# Patient Record
Sex: FEMALE | Race: WHITE
[De-identification: names, ages, dates, MRNs, and addresses within clinical notes are randomized per-mention and may not be internally consistent; named-entity substitution may affect disease eponyms.]

---
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
## 2020-04-24 ENCOUNTER — HOSPITAL ENCOUNTER (EMERGENCY)
Dept: HOSPITAL 47 - EC | Age: 20
Discharge: HOME | End: 2020-04-24
Payer: COMMERCIAL

## 2020-04-24 VITALS — RESPIRATION RATE: 18 BRPM

## 2020-04-24 VITALS — TEMPERATURE: 98.3 F | DIASTOLIC BLOOD PRESSURE: 79 MMHG | SYSTOLIC BLOOD PRESSURE: 119 MMHG | HEART RATE: 89 BPM

## 2020-04-24 DIAGNOSIS — R10.32: ICD-10-CM

## 2020-04-24 DIAGNOSIS — O99.89: Primary | ICD-10-CM

## 2020-04-24 DIAGNOSIS — Z3A.01: ICD-10-CM

## 2020-04-24 DIAGNOSIS — Z88.8: ICD-10-CM

## 2020-04-24 LAB
ALBUMIN SERPL-MCNC: 4.1 G/DL (ref 3.5–5)
ALP SERPL-CCNC: 45 U/L (ref 38–126)
ALT SERPL-CCNC: 13 U/L (ref 4–34)
ANION GAP SERPL CALC-SCNC: 9 MMOL/L
AST SERPL-CCNC: 22 U/L (ref 14–36)
BASOPHILS # BLD AUTO: 0.1 K/UL (ref 0–0.2)
BASOPHILS NFR BLD AUTO: 1 %
BUN SERPL-SCNC: 10 MG/DL (ref 7–17)
CALCIUM SPEC-MCNC: 9.3 MG/DL (ref 8.4–10.2)
CHLORIDE SERPL-SCNC: 106 MMOL/L (ref 98–107)
CO2 SERPL-SCNC: 22 MMOL/L (ref 22–30)
EOSINOPHIL # BLD AUTO: 0.1 K/UL (ref 0–0.7)
EOSINOPHIL NFR BLD AUTO: 1 %
ERYTHROCYTE [DISTWIDTH] IN BLOOD BY AUTOMATED COUNT: 4.42 M/UL (ref 3.8–5.4)
ERYTHROCYTE [DISTWIDTH] IN BLOOD: 12 % (ref 11.5–15.5)
GLUCOSE SERPL-MCNC: 76 MG/DL (ref 74–99)
HCG SERPL-MCNC: 3566.8 MIU/ML
HCT VFR BLD AUTO: 40.9 % (ref 34–46)
HGB BLD-MCNC: 13.4 GM/DL (ref 11.4–16)
LYMPHOCYTES # SPEC AUTO: 1.8 K/UL (ref 1–4.8)
LYMPHOCYTES NFR SPEC AUTO: 22 %
MCH RBC QN AUTO: 30.3 PG (ref 25–35)
MCHC RBC AUTO-ENTMCNC: 32.7 G/DL (ref 31–37)
MCV RBC AUTO: 92.6 FL (ref 80–100)
MONOCYTES # BLD AUTO: 0.3 K/UL (ref 0–1)
MONOCYTES NFR BLD AUTO: 3 %
NEUTROPHILS # BLD AUTO: 5.8 K/UL (ref 1.3–7.7)
NEUTROPHILS NFR BLD AUTO: 72 %
PH UR: 7 [PH] (ref 5–8)
PLATELET # BLD AUTO: 275 K/UL (ref 150–450)
POTASSIUM SERPL-SCNC: 3.9 MMOL/L (ref 3.5–5.1)
PROT SERPL-MCNC: 6.8 G/DL (ref 6.3–8.2)
SODIUM SERPL-SCNC: 137 MMOL/L (ref 137–145)
SP GR UR: 1.02 (ref 1–1.03)
UROBILINOGEN UR QL STRIP: <2 MG/DL (ref ?–2)
WBC # BLD AUTO: 8.1 K/UL (ref 4–11)

## 2020-04-24 PROCEDURE — 76817 TRANSVAGINAL US OBSTETRIC: CPT

## 2020-04-24 PROCEDURE — 76801 OB US < 14 WKS SINGLE FETUS: CPT

## 2020-04-24 PROCEDURE — 81003 URINALYSIS AUTO W/O SCOPE: CPT

## 2020-04-24 PROCEDURE — 80053 COMPREHEN METABOLIC PANEL: CPT

## 2020-04-24 PROCEDURE — 36415 COLL VENOUS BLD VENIPUNCTURE: CPT

## 2020-04-24 PROCEDURE — 99284 EMERGENCY DEPT VISIT MOD MDM: CPT

## 2020-04-24 PROCEDURE — 84702 CHORIONIC GONADOTROPIN TEST: CPT

## 2020-04-24 PROCEDURE — 85025 COMPLETE CBC W/AUTO DIFF WBC: CPT

## 2020-04-24 NOTE — US
EXAMINATION TYPE: Ultrasound OB <= 14 weeks transvaginal

 

DATE OF EXAM: 4/24/2020 10:18 AM

 

COMPARISON: NONE

 

CLINICAL HISTORY: 20-year-old female cramping LMP 3/26. Left pelvic pain

 

EXAM PERFORMED:  Transvaginal (TV) and Transabdominal (TA)

 

FINDINGS:

 

EXAM MEASUREMENTS:

 

GESTATIONAL AGE / DATING

 

Physician Established: Not yet established 

Dates by LMP: (5 weeks/0 days)  

** EDC: 12/25/20

Dates by First Scan:  No previous this is first scan 

Dates by Current Scan for: (5 weeks/1 days)  

** EDC: 12/24/20

 

MATERNAL ANATOMY 

 

Uterus: 8.7 x 6.1 x 7.7cm

Right Ovary: 3.8 x 2.2 x 2.6cm

Left Ovary: 2.8 x 1.4 x 1.8cm

Post CDS / Adnexa: small amount of free fluid posterior CDS

Presence of free fluid: yes

Presence of corpus luteal cyst: yes, right ovary = 2.6 x 2.1 x 2.0cm

 

 

GESTATION / FETAL SURVEY

 

MSD: 0.6cm (5 weeks/1 days)

Yolk Sac (normal less than 6mm): 0.2cm

No evidence of fetal pole at this time

 

 

Date of LMP: 03/20/20

Beta HcG (if available): [id number]

 

Sonographer notes: Gestational sac with yolk sac identified within uterus, unable to visualize fetal 
pole at this time, possible too early to detect. Probable corpus luteum right ovary. Small amount of 
free fluid posterior cul-de-sac 

 

 

 

IMPRESSION:

 

1. Small intrauterine gestational sac with yolk sac visualized. MSD places the gestation at 5 weeks 1
 day, concordant with gestational age by LMP (5 weeks 0 days). Follow-up ultrasound recommended to en
sure the appearance of a fetal pole with cardiac activity to ensure a viable intrauterine pregnancy.

2. Small amount of cul-de-sac free fluid, likely physiologic. A 2.6 cm corpus luteum on the right.

## 2020-04-24 NOTE — ED
General Adult HPI





- General


Chief complaint: Abdominal Pain


Stated complaint: abd pain/early pregnancy


Time Seen by Provider: 04/24/20 08:52


Source: patient, RN notes reviewed, old records reviewed


Mode of arrival: ambulatory


Limitations: no limitations





- History of Present Illness


Initial comments: 


20-year-old female patient who believes that she is essentially approximately 6 

weeks pregnant presents to ED for chief complaint of vaginal cramping.  Patient 

for that her last menstrual period was around 3/20.  She states that she has 

taken 2 positive pregnancy tests since then.  Reports that last Tuesday she is 

having some cramping in her lower quadrant mostly on her left side.  Has been 

taking prenatal vitamins.  Is not yet established with OB/GYN.  Denies any 

dysuria, vaginal bleeding.





Systemic: Pt denies fatigue, fever/chills, rash. Pt denies weakness, night 

sweats, weight loss. 


Neuro: Pt denies headache, visual disturbances, syncope or pre-syncope.


HEENT: Pt denies ocular discharge or irritation, otalgia, rhinorrhea, 

pharyngitis or notable lymphadenopathy. 


Cardiopulmonary: Pt denies chest pain, SOB, heart palpitations, dyspnea on exert

ion.  


Abdominal/GI: Pt denies n/v/d. 


: Pt denies dysuria, burning w/ urination, frequency/urgency. Denies new onset

urinary or bowel incontinence.  


MSK: Pt denies myalgia, loss of strength or function in extremities. 


Neuro: Pt denies new onset weakness, paresthesias. 








- Related Data


                                Home Medications











 Medication  Instructions  Recorded  Confirmed


 


No Known Home Medications  03/06/16 03/06/16











                                    Allergies











Allergy/AdvReac Type Severity Reaction Status Date / Time


 


omeprazole Allergy  Rash/Hives Verified 04/24/20 08:48














Review of Systems


ROS Statement: 


Those systems with pertinent positive or pertinent negative responses have been 

documented in the HPI.





ROS Other: All systems not noted in ROS Statement are negative.





Past Medical History


Past Medical History: No Reported History


History of Any Multi-Drug Resistant Organisms: None Reported


Past Surgical History: No Surgical Hx Reported


Past Psychological History: ADD/ADHD


Smoking Status: Never smoker


Past Alcohol Use History: None Reported


Past Drug Use History: None Reported





General Exam





- General Exam Comments


Initial Comments: 








Constitutional: NAD, AOX3, Pt has pleasant affect. 


HEENT: NC/AT, trachea midline, neck supple, no lymphadenopathy. Posterior 

pharynx non erythematous, without exudates. External ears appear normal, without

discharge. Mucous membranes moist. Eyes PERRLA, EOM intact. There is no scleral 

icterus. No pallor noted. 


Cardiopulmonary: RRR, no murmurs, rubs or gallops, no JVD noted. Lungs CTAB in 

anterior and posterior fields. No peripheral edema. 


Abdominal exam: Abdomen soft and non-distended. Abdomen mildly tender to 

palpation left lower quadrant region.. Bowel sounds active in LLQ. No he

patosplenomegaly. No ecchymosis


Neuro: CN II-XII grossly intact. No nuchal rigidity. No raccon eyes, no marte 

sign, no hemotympanum. No cervical spinal tenderness. 


MSK: No posterior calf tenderness bilaterally, homans sign negative bilaterally.

Posterior tibialis and radial pulse +2 bilaterally. Sensation intact in upper 

and lower extremities. Full active ROM in upper and lower extremities, 5/5 

stregnth. 














Limitations: no limitations





Course


                                   Vital Signs











  04/24/20





  08:48


 


Temperature 98.9 F


 


Pulse Rate 85


 


Respiratory 18





Rate 


 


Blood Pressure 112/70


 


O2 Sat by Pulse 99





Oximetry 














Medical Decision Making





- Medical Decision Making


20-year-old female patient who believes that she is essentially approximately 6 

weeks pregnant presents to ED for chief complaint of vaginal cramping.  Patient 

for that her last menstrual period was around 3/20.  She states that she has 

taken 2 positive pregnancy tests since then.  Reports that last Tuesday she is 

having some cramping in her lower quadrant mostly on her left side.  Has been 

taking prenatal vitamins.  Is not yet established with OB/GYN.  Denies any 

dysuria, vaginal bleeding.  Patient fell signs stable, afebrile.  Physical exam 

displayed very mild left lower quadrant tenderness.  No guarding no rigidity.  

Laboratory investigations revealed hCG Quant of 3566.  UA is negative.  

Transvaginal ultrasound display a small intrauterine gestational yolk sac, 

gestation estimated 5 weeks and 1 day.  Fetal pole is not yet visualized.  2.6 

cm likely corpus luteum cyst noted on the right.  Patient will be discharged 

with outpatient OB follow-up and will return to ER if condition worsens.  Case 

discussed with Dr. Guillen. 








- Lab Data


Result diagrams: 


                                 04/24/20 09:10





                                 04/24/20 09:10


                                   Lab Results











  04/24/20 04/24/20 04/24/20 Range/Units





  09:10 09:10 09:10 


 


WBC  8.1    (4.0-11.0)  k/uL


 


RBC  4.42    (3.80-5.40)  m/uL


 


Hgb  13.4    (11.4-16.0)  gm/dL


 


Hct  40.9    (34.0-46.0)  %


 


MCV  92.6    (80.0-100.0)  fL


 


MCH  30.3    (25.0-35.0)  pg


 


MCHC  32.7    (31.0-37.0)  g/dL


 


RDW  12.0    (11.5-15.5)  %


 


Plt Count  275    (150-450)  k/uL


 


Neutrophils %  72    %


 


Lymphocytes %  22    %


 


Monocytes %  3    %


 


Eosinophils %  1    %


 


Basophils %  1    %


 


Neutrophils #  5.8    (1.3-7.7)  k/uL


 


Lymphocytes #  1.8    (1.0-4.8)  k/uL


 


Monocytes #  0.3    (0-1.0)  k/uL


 


Eosinophils #  0.1    (0-0.7)  k/uL


 


Basophils #  0.1    (0-0.2)  k/uL


 


Sodium    137  (137-145)  mmol/L


 


Potassium    3.9  (3.5-5.1)  mmol/L


 


Chloride    106  ()  mmol/L


 


Carbon Dioxide    22  (22-30)  mmol/L


 


Anion Gap    9  mmol/L


 


BUN    10  (7-17)  mg/dL


 


Creatinine    0.54  (0.52-1.04)  mg/dL


 


Est GFR (CKD-EPI)AfAm    >90  (>60 ml/min/1.73 sqM)  


 


Est GFR (CKD-EPI)NonAf    >90  (>60 ml/min/1.73 sqM)  


 


Glucose    76  (74-99)  mg/dL


 


Calcium    9.3  (8.4-10.2)  mg/dL


 


Total Bilirubin    0.9  (0.2-1.3)  mg/dL


 


AST    22  (14-36)  U/L


 


ALT    13  (4-34)  U/L


 


Alkaline Phosphatase    45  ()  U/L


 


Total Protein    6.8  (6.3-8.2)  g/dL


 


Albumin    4.1  (3.5-5.0)  g/dL


 


HCG, Quant    3566.8  mIU/mL


 


Urine Color   Yellow   


 


Urine Appearance   Clear   (Clear)  


 


Urine pH   7.0   (5.0-8.0)  


 


Ur Specific Gravity   1.017   (1.001-1.035)  


 


Urine Protein   Negative   (Negative)  


 


Urine Glucose (UA)   Negative   (Negative)  


 


Urine Ketones   Negative   (Negative)  


 


Urine Blood   Negative   (Negative)  


 


Urine Nitrite   Negative   (Negative)  


 


Urine Bilirubin   Negative   (Negative)  


 


Urine Urobilinogen   <2.0   (<2.0)  mg/dL


 


Ur Leukocyte Esterase   Negative   (Negative)  














Disposition


Clinical Impression: 


 Pregnancy, Abdominal cramping





Disposition: HOME SELF-CARE


Condition: Stable


Instructions (If sedation given, give patient instructions):  Pregnancy (ED)


Additional Instructions: 


Follow-up with primary care provider and OB/GYN.  Call today after discharge.  

Return to ER if condition worsens.  Continue taking prenatal vitamins as 

directed. 


Is patient prescribed a controlled substance at d/c from ED?: No


Referrals: 


Rachell Jones MD [Primary Care Provider] - 1-2 days


Kareem Chen MD [STAFF PHYSICIAN] - 1-2 days

## 2020-04-26 ENCOUNTER — HOSPITAL ENCOUNTER (EMERGENCY)
Dept: HOSPITAL 47 - EC | Age: 20
Discharge: HOME | End: 2020-04-26
Payer: COMMERCIAL

## 2020-04-26 VITALS — TEMPERATURE: 98.1 F

## 2020-04-26 VITALS — HEART RATE: 71 BPM | SYSTOLIC BLOOD PRESSURE: 117 MMHG | DIASTOLIC BLOOD PRESSURE: 61 MMHG

## 2020-04-26 VITALS — RESPIRATION RATE: 20 BRPM

## 2020-04-26 DIAGNOSIS — R82.71: ICD-10-CM

## 2020-04-26 DIAGNOSIS — Z88.8: ICD-10-CM

## 2020-04-26 DIAGNOSIS — O20.9: Primary | ICD-10-CM

## 2020-04-26 DIAGNOSIS — Z3A.01: ICD-10-CM

## 2020-04-26 DIAGNOSIS — O99.89: ICD-10-CM

## 2020-04-26 LAB
ALBUMIN SERPL-MCNC: 4.3 G/DL (ref 3.5–5)
ALP SERPL-CCNC: 42 U/L (ref 38–126)
ALT SERPL-CCNC: 13 U/L (ref 4–34)
ANION GAP SERPL CALC-SCNC: 7 MMOL/L
AST SERPL-CCNC: 29 U/L (ref 14–36)
BASOPHILS # BLD AUTO: 0.1 K/UL (ref 0–0.2)
BASOPHILS NFR BLD AUTO: 1 %
BUN SERPL-SCNC: 9 MG/DL (ref 7–17)
CALCIUM SPEC-MCNC: 9.5 MG/DL (ref 8.4–10.2)
CHLORIDE SERPL-SCNC: 106 MMOL/L (ref 98–107)
CO2 SERPL-SCNC: 23 MMOL/L (ref 22–30)
EOSINOPHIL # BLD AUTO: 0.1 K/UL (ref 0–0.7)
EOSINOPHIL NFR BLD AUTO: 1 %
ERYTHROCYTE [DISTWIDTH] IN BLOOD BY AUTOMATED COUNT: 4.55 M/UL (ref 3.8–5.4)
ERYTHROCYTE [DISTWIDTH] IN BLOOD: 12.1 % (ref 11.5–15.5)
GLUCOSE SERPL-MCNC: 79 MG/DL (ref 74–99)
HCG SERPL-MCNC: 8883.5 MIU/ML
HCT VFR BLD AUTO: 41.8 % (ref 34–46)
HGB BLD-MCNC: 13.9 GM/DL (ref 11.4–16)
LYMPHOCYTES # SPEC AUTO: 1.9 K/UL (ref 1–4.8)
LYMPHOCYTES NFR SPEC AUTO: 21 %
MCH RBC QN AUTO: 30.5 PG (ref 25–35)
MCHC RBC AUTO-ENTMCNC: 33.2 G/DL (ref 31–37)
MCV RBC AUTO: 91.9 FL (ref 80–100)
MONOCYTES # BLD AUTO: 0.4 K/UL (ref 0–1)
MONOCYTES NFR BLD AUTO: 4 %
NEUTROPHILS # BLD AUTO: 6.5 K/UL (ref 1.3–7.7)
NEUTROPHILS NFR BLD AUTO: 71 %
PH UR: 6 [PH] (ref 5–8)
PLATELET # BLD AUTO: 292 K/UL (ref 150–450)
POTASSIUM SERPL-SCNC: 4.3 MMOL/L (ref 3.5–5.1)
PROT SERPL-MCNC: 7 G/DL (ref 6.3–8.2)
RBC UR QL: 7 /HPF (ref 0–5)
SODIUM SERPL-SCNC: 136 MMOL/L (ref 137–145)
SP GR UR: 1.03 (ref 1–1.03)
SQUAMOUS UR QL AUTO: 23 /HPF (ref 0–4)
UROBILINOGEN UR QL STRIP: <2 MG/DL (ref ?–2)
WBC # BLD AUTO: 9.1 K/UL (ref 4–11)
WBC #/AREA URNS HPF: 20 /HPF (ref 0–5)

## 2020-04-26 PROCEDURE — 36415 COLL VENOUS BLD VENIPUNCTURE: CPT

## 2020-04-26 PROCEDURE — 84702 CHORIONIC GONADOTROPIN TEST: CPT

## 2020-04-26 PROCEDURE — 87808 TRICHOMONAS ASSAY W/OPTIC: CPT

## 2020-04-26 PROCEDURE — 86900 BLOOD TYPING SEROLOGIC ABO: CPT

## 2020-04-26 PROCEDURE — 87591 N.GONORRHOEAE DNA AMP PROB: CPT

## 2020-04-26 PROCEDURE — 81025 URINE PREGNANCY TEST: CPT

## 2020-04-26 PROCEDURE — 85025 COMPLETE CBC W/AUTO DIFF WBC: CPT

## 2020-04-26 PROCEDURE — 87491 CHLMYD TRACH DNA AMP PROBE: CPT

## 2020-04-26 PROCEDURE — 86901 BLOOD TYPING SEROLOGIC RH(D): CPT

## 2020-04-26 PROCEDURE — 80053 COMPREHEN METABOLIC PANEL: CPT

## 2020-04-26 PROCEDURE — 99284 EMERGENCY DEPT VISIT MOD MDM: CPT

## 2020-04-26 PROCEDURE — 86850 RBC ANTIBODY SCREEN: CPT

## 2020-04-26 PROCEDURE — 87070 CULTURE OTHR SPECIMN AEROBIC: CPT

## 2020-04-26 PROCEDURE — 96372 THER/PROPH/DIAG INJ SC/IM: CPT

## 2020-04-26 PROCEDURE — 81001 URINALYSIS AUTO W/SCOPE: CPT

## 2020-04-26 NOTE — ED
General Adult HPI





- General


Chief complaint: Vaginal Bleeding


Stated complaint: vaginal bleeding pregnant


Time Seen by Provider: 20 10:46


Source: patient, RN notes reviewed, old records reviewed


Mode of arrival: ambulatory


Limitations: no limitations





- History of Present Illness


Initial comments: 


20-year-old female patient proximally 5 weeks gestation  presents to ED for

evaluation of vaginal bleeding.  Patient was initially seen for cramping in the 

in the emergency department on .  Patient reports that she is still having 

some mild suprapubic cramping however yesterday and today she began having some 

very mild bleeding.  She denies any dysuria or any concern for STI.  Denies any 

other complaints.





Systemic: Pt denies fatigue, fever/chills, rash. Pt denies weakness, night 

sweats, weight loss. 


Neuro: Pt denies headache, visual disturbances, syncope or pre-syncope.


HEENT: Pt denies ocular discharge or irritation, otalgia, rhinorrhea, 

pharyngitis or notable lymphadenopathy. 


Cardiopulmonary: Pt denies chest pain, SOB, heart palpitations, dyspnea on 

exertion.  


Abdominal/GI: Pt denies abdominal pain, n/v/d. 


: Pt denies dysuria, burning w/ urination, frequency/urgency. Denies new onset

urinary or bowel incontinence.  


MSK: Pt denies myalgia, loss of strength or function in extremities. 


Neuro: Pt denies new onset weakness, paresthesias. 











- Related Data


                                  Previous Rx's











 Medication  Instructions  Recorded


 


Cephalexin [Keflex] 500 mg PO Q12HR 5 Days #10 cap 20











                                    Allergies











Allergy/AdvReac Type Severity Reaction Status Date / Time


 


omeprazole Allergy  Rash/Hives Verified 20 10:42














Review of Systems


ROS Statement: 


Those systems with pertinent positive or pertinent negative responses have been 

documented in the HPI.





ROS Other: All systems not noted in ROS Statement are negative.





Past Medical History


Past Medical History: No Reported History


History of Any Multi-Drug Resistant Organisms: None Reported


Past Surgical History: No Surgical Hx Reported


Past Psychological History: ADD/ADHD


Smoking Status: Never smoker


Past Alcohol Use History: None Reported


Past Drug Use History: None Reported





General Exam





- General Exam Comments


Initial Comments: 








Constitutional: NAD, AOX3, Pt has pleasant affect. 


HEENT: NC/AT, trachea midline, neck supple, no lymphadenopathy. Posterior 

pharynx non erythematous, without exudates. External ears appear normal, without

 discharge. Mucous membranes moist. Eyes PERRLA, EOM intact. There is no scleral

 icterus. No pallor noted. 


Cardiopulmonary: RRR, no murmurs, rubs or gallops, no JVD noted. Lungs CTAB in 

anterior and posterior fields. No peripheral edema. 


Abdominal exam: Abdomen soft and non-distended. Abdomen non-tender to palpation 

in all 4 quadrants.  Bowel sounds active in LLQ. No hepatosplenomegaly. No 

ecchymosis


Neuro: CN II-XII grossly intact. No nuchal rigidity. No raccon eyes, no marte 

sign, no hemotympanum. No cervical spinal tenderness. 


MSK: No posterior calf tenderness bilaterally, homans sign negative bilaterally.

 Posterior tibialis and radial pulse +2 bilaterally. Sensation intact in upper 

and lower extremities. Full active ROM in upper and lower extremities, 5/5 

stregnth. 


Pelvic: Pelvic exam performed.  Cervical os is closed.  No lesions, mucova pink.

 No purulent drainage noted. Chaperogned by ALEX Spaulding 











Limitations: no limitations





Course





                                   Vital Signs











  20





  10:42 10:45 11:45


 


Temperature 98.1 F  


 


Pulse Rate 102 H  


 


Respiratory 18 20 20





Rate   


 


Blood Pressure 98/57  


 


O2 Sat by Pulse 99  





Oximetry   














  20





  12:45


 


Temperature 


 


Pulse Rate 78


 


Respiratory 20





Rate 


 


Blood Pressure 110/59


 


O2 Sat by Pulse 99





Oximetry 














Medical Decision Making





- Medical Decision Making








20-year-old female patient proximally 5 weeks gestation  presents to ED for

 evaluation of vaginal bleeding.  Patient was initially seen for cramping in the

 in the emergency department on .  Patient reports that she is still having 

some mild suprapubic cramping however yesterday and today she began having some 

very mild bleeding.  She denies any dysuria or any concern for STI.  Denies any 

other complaints.  Patient vital signs are stable, afebrile.  Physical exam 

displayed: Abdomen soft and non-distended. Abdomen non-tender to palpation in 

all 4 quadrants.  Previous ultrasound displayed small effusion on gestation with

 yolk sac estimated 5 weeks 1 day.  HCG Quant kim from [phone].  UA was 

contaminated with squamous epithelial cells are did display 20 white cells and 

bacteria.  Patient was offered and did wish to have a pelvic exam performed, 

chaperogned by Man.  This displayed closed cervix no blood was noted.  

Patient discharged in stable condition.  Patient is in no pain at time of disc

harge.  He'll follow up with primary care provider as well as OB/GYN.  Patient 

will have repeat beta hCG performed in 48 hours to ensure appropriate increase. 

Keflex for asyptomatic bacturia. Will return to ED if condition worsens. Case 

discussed with Dr. Perkins. 














- Lab Data


Result diagrams: 


                                 20 11:50





                                 20 11:50





                                   Lab Results











  20 Range/Units





  11:30 11:50 11:50 


 


WBC   9.1   (4.0-11.0)  k/uL


 


RBC   4.55   (3.80-5.40)  m/uL


 


Hgb   13.9   (11.4-16.0)  gm/dL


 


Hct   41.8   (34.0-46.0)  %


 


MCV   91.9   (80.0-100.0)  fL


 


MCH   30.5   (25.0-35.0)  pg


 


MCHC   33.2   (31.0-37.0)  g/dL


 


RDW   12.1   (11.5-15.5)  %


 


Plt Count   292   (150-450)  k/uL


 


Neutrophils %   71   %


 


Lymphocytes %   21   %


 


Monocytes %   4   %


 


Eosinophils %   1   %


 


Basophils %   1   %


 


Neutrophils #   6.5   (1.3-7.7)  k/uL


 


Lymphocytes #   1.9   (1.0-4.8)  k/uL


 


Monocytes #   0.4   (0-1.0)  k/uL


 


Eosinophils #   0.1   (0-0.7)  k/uL


 


Basophils #   0.1   (0-0.2)  k/uL


 


Sodium     (137-145)  mmol/L


 


Potassium     (3.5-5.1)  mmol/L


 


Chloride     ()  mmol/L


 


Carbon Dioxide     (22-30)  mmol/L


 


Anion Gap     mmol/L


 


BUN     (7-17)  mg/dL


 


Creatinine     (0.52-1.04)  mg/dL


 


Est GFR (CKD-EPI)AfAm     (>60 ml/min/1.73 sqM)  


 


Est GFR (CKD-EPI)NonAf     (>60 ml/min/1.73 sqM)  


 


Glucose     (74-99)  mg/dL


 


Calcium     (8.4-10.2)  mg/dL


 


Total Bilirubin     (0.2-1.3)  mg/dL


 


AST     (14-36)  U/L


 


ALT     (4-34)  U/L


 


Alkaline Phosphatase     ()  U/L


 


Total Protein     (6.3-8.2)  g/dL


 


Albumin     (3.5-5.0)  g/dL


 


HCG, Quant     mIU/mL


 


Urine Color     


 


Urine Appearance     (Clear)  


 


Urine pH     (5.0-8.0)  


 


Ur Specific Gravity     (1.001-1.035)  


 


Urine Protein     (Negative)  


 


Urine Glucose (UA)     (Negative)  


 


Urine Ketones     (Negative)  


 


Urine Blood     (Negative)  


 


Urine Nitrite     (Negative)  


 


Urine Bilirubin     (Negative)  


 


Urine Urobilinogen     (<2.0)  mg/dL


 


Ur Leukocyte Esterase     (Negative)  


 


Urine RBC     (0-5)  /hpf


 


Urine WBC     (0-5)  /hpf


 


Ur Squamous Epith Cells     (0-4)  /hpf


 


Urine Bacteria     (None)  /hpf


 


Urine Mucus     (None)  /hpf


 


Urine HCG, Qual    Detected  (Not Detectd)  


 


Blood Type  O Negative    


 


Blood Type Recheck  No Previous Record    


 


Bld Type Recheck Status  Prenatal    


 


Antibody Screen  NEGATIVE    














  20 Range/Units





  11:50 11:50 


 


WBC    (4.0-11.0)  k/uL


 


RBC    (3.80-5.40)  m/uL


 


Hgb    (11.4-16.0)  gm/dL


 


Hct    (34.0-46.0)  %


 


MCV    (80.0-100.0)  fL


 


MCH    (25.0-35.0)  pg


 


MCHC    (31.0-37.0)  g/dL


 


RDW    (11.5-15.5)  %


 


Plt Count    (150-450)  k/uL


 


Neutrophils %    %


 


Lymphocytes %    %


 


Monocytes %    %


 


Eosinophils %    %


 


Basophils %    %


 


Neutrophils #    (1.3-7.7)  k/uL


 


Lymphocytes #    (1.0-4.8)  k/uL


 


Monocytes #    (0-1.0)  k/uL


 


Eosinophils #    (0-0.7)  k/uL


 


Basophils #    (0-0.2)  k/uL


 


Sodium  136 L   (137-145)  mmol/L


 


Potassium  4.3   (3.5-5.1)  mmol/L


 


Chloride  106   ()  mmol/L


 


Carbon Dioxide  23   (22-30)  mmol/L


 


Anion Gap  7   mmol/L


 


BUN  9   (7-17)  mg/dL


 


Creatinine  0.53   (0.52-1.04)  mg/dL


 


Est GFR (CKD-EPI)AfAm  >90   (>60 ml/min/1.73 sqM)  


 


Est GFR (CKD-EPI)NonAf  >90   (>60 ml/min/1.73 sqM)  


 


Glucose  79   (74-99)  mg/dL


 


Calcium  9.5   (8.4-10.2)  mg/dL


 


Total Bilirubin  1.1   (0.2-1.3)  mg/dL


 


AST  29   (14-36)  U/L


 


ALT  13   (4-34)  U/L


 


Alkaline Phosphatase  42   ()  U/L


 


Total Protein  7.0   (6.3-8.2)  g/dL


 


Albumin  4.3   (3.5-5.0)  g/dL


 


HCG, Quant  8883.5   mIU/mL


 


Urine Color   Yellow  


 


Urine Appearance   Cloudy H  (Clear)  


 


Urine pH   6.0  (5.0-8.0)  


 


Ur Specific Gravity   1.029  (1.001-1.035)  


 


Urine Protein   Trace H  (Negative)  


 


Urine Glucose (UA)   Negative  (Negative)  


 


Urine Ketones   Negative  (Negative)  


 


Urine Blood   Moderate H  (Negative)  


 


Urine Nitrite   Negative  (Negative)  


 


Urine Bilirubin   Negative  (Negative)  


 


Urine Urobilinogen   <2.0  (<2.0)  mg/dL


 


Ur Leukocyte Esterase   Large H  (Negative)  


 


Urine RBC   7 H  (0-5)  /hpf


 


Urine WBC   20 H  (0-5)  /hpf


 


Ur Squamous Epith Cells   23 H  (0-4)  /hpf


 


Urine Bacteria   Rare H  (None)  /hpf


 


Urine Mucus   Many H  (None)  /hpf


 


Urine HCG, Qual    (Not Detectd)  


 


Blood Type    


 


Blood Type Recheck    


 


Bld Type Recheck Status    


 


Antibody Screen    














Disposition


Clinical Impression: 


 Asymptomatic bacteriuria, Vaginal bleeding affecting early pregnancy





Disposition: HOME SELF-CARE


Condition: Stable


Instructions (If sedation given, give patient instructions):  Threatened Mis

carriage (ED)


Additional Instructions: 


Call OB/GYN after discharge for close outpatient follow-up.  Follow-up with 

primary care provider tomorrow.  Take Keflex as directed.  Have repeat blood 

draw in 48 hours to ensure appropriate rise in hCG.  Return to ER if condition 

worsens.


Is patient prescribed a controlled substance at d/c from ED?: No


Referrals: 


Rachell Jones MD [Primary Care Provider] - 1-2 days


Valentina Barroso MD [STAFF PHYSICIAN] - 1-2 days

## 2021-07-26 ENCOUNTER — HOSPITAL ENCOUNTER (EMERGENCY)
Dept: HOSPITAL 47 - EC | Age: 21
Discharge: HOME | End: 2021-07-26
Payer: COMMERCIAL

## 2021-07-26 VITALS
RESPIRATION RATE: 18 BRPM | DIASTOLIC BLOOD PRESSURE: 81 MMHG | TEMPERATURE: 97.6 F | SYSTOLIC BLOOD PRESSURE: 119 MMHG | HEART RATE: 72 BPM

## 2021-07-26 DIAGNOSIS — Z88.8: ICD-10-CM
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
## 2021-07-26 DIAGNOSIS — S13.9XXA: Primary | ICD-10-CM
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
## 2021-07-26 PROCEDURE — 71045 X-RAY EXAM CHEST 1 VIEW: CPT

## 2021-07-26 PROCEDURE — 72170 X-RAY EXAM OF PELVIS: CPT

## 2021-07-26 PROCEDURE — 99284 EMERGENCY DEPT VISIT MOD MDM: CPT

## 2021-07-26 PROCEDURE — 72125 CT NECK SPINE W/O DYE: CPT

## 2021-07-26 PROCEDURE — 70450 CT HEAD/BRAIN W/O DYE: CPT
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
## 2021-07-26 NOTE — XR
EXAMINATION TYPE: XR pelvis AP view

 

DATE OF EXAM: 7/26/2021

 

COMPARISON: NONE

 

HISTORY: Pain

 

TECHNIQUE: Single view

 

FINDINGS: Pelvic ring appears intact. Proximal femurs and hip joints appear intact. Sacroiliac joints
 appear normal.

 

IMPRESSION: Normal pelvis
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
## 2021-08-20 ENCOUNTER — HOSPITAL ENCOUNTER (EMERGENCY)
Dept: HOSPITAL 47 - EC | Age: 21
Discharge: HOME | End: 2021-08-20
Payer: COMMERCIAL

## 2021-08-20 VITALS — RESPIRATION RATE: 18 BRPM | DIASTOLIC BLOOD PRESSURE: 66 MMHG | HEART RATE: 78 BPM | SYSTOLIC BLOOD PRESSURE: 124 MMHG

## 2021-08-20 VITALS — TEMPERATURE: 99.1 F

## 2021-08-20 DIAGNOSIS — S63.501A: ICD-10-CM
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
## 2021-08-20 DIAGNOSIS — Z87.891: ICD-10-CM

## 2021-08-20 DIAGNOSIS — S43.51XA: Primary | ICD-10-CM
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
## 2021-08-20 PROCEDURE — 99284 EMERGENCY DEPT VISIT MOD MDM: CPT

## 2021-08-20 NOTE — XR
EXAMINATION TYPE: XR wrist complete RT

 

DATE OF EXAM: 8/20/2021

 

COMPARISON: NONE

 

HISTORY: Pain

 

TECHNIQUE: Four views submitted.

 

FINDINGS:

The osseous structures are intact.  The joint spaces are preserved and there is no acute fracture or 
dislocation.  

 

IMPRESSION:

1.  No definite acute fracture or dislocation if symptoms persist, follow-up study in 7 to 10 days wo
uld be suggested

## 2021-08-20 NOTE — XR
EXAMINATION TYPE: XR shoulder complete RT

 

DATE OF EXAM: 8/20/2021

 

COMPARISON: NONE

 

HISTORY: Pain

 

TECHNIQUE: Three views are submitted.

 

FINDINGS:

The osseous structures are intact.  There is no acute fracture or dislocation.  The AC joint is maint
ained. Slight elevation of the clavicle. 

 

IMPRESSION:

1. No acute fracture.  AC joint distance is maintained. There is slight elevation of clavicle which c
ould be congenital. If concern for AC joint separation correlate with MRI.
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
## 2021-08-20 NOTE — XR
EXAMINATION TYPE: XR elbow complete LT

 

DATE OF EXAM: 8/20/2021

 

COMPARISON: NONE

 

HISTORY: Pain

 

FINDINGS: 

Three views of the elbow demonstrate no pathologic joint effusion.  The osseous structures are intact
.  There is no acute fracture or dislocation.  

 

 

IMPRESSION:

1. No acute fracture or dislocation.  If symptoms persist follow-up study in 7 to 10 days could be ob
tained.

## 2021-08-20 NOTE — XR
EXAMINATION TYPE: XR hand complete RT

 

DATE OF EXAM: 8/20/2021

 

COMPARISON: NONE

 

HISTORY: Pain

 

TECHNIQUE: Three views are submitted.

 

FINDINGS:

The osseous structures are intact.  The joint spaces are preserved and there is no acute fracture or 
dislocation.  

 

IMPRESSION:

1.  No definite acute fracture or dislocation if symptoms persist, follow-up study in 7 to 10 days wo
uld be suggested

## 2021-08-22 ENCOUNTER — HOSPITAL ENCOUNTER (EMERGENCY)
Dept: HOSPITAL 47 - EC | Age: 21
Discharge: HOME | End: 2021-08-22
Payer: COMMERCIAL

## 2021-08-22 VITALS — TEMPERATURE: 98 F | SYSTOLIC BLOOD PRESSURE: 128 MMHG | DIASTOLIC BLOOD PRESSURE: 70 MMHG | HEART RATE: 78 BPM

## 2021-08-22 VITALS — RESPIRATION RATE: 16 BRPM

## 2021-08-22 DIAGNOSIS — Z87.891: ICD-10-CM

## 2021-08-22 DIAGNOSIS — N39.0: Primary | ICD-10-CM

## 2021-08-22 DIAGNOSIS — Z88.8: ICD-10-CM

## 2021-08-22 LAB
ALBUMIN SERPL-MCNC: 3.8 G/DL (ref 3.5–5)
ALP SERPL-CCNC: 48 U/L (ref 38–126)
ALT SERPL-CCNC: 9 U/L (ref 4–34)
AMYLASE SERPL-CCNC: 62 U/L (ref 30–110)
ANION GAP SERPL CALC-SCNC: 8 MMOL/L
AST SERPL-CCNC: 20 U/L (ref 14–36)
BASOPHILS # BLD AUTO: 0.1 K/UL (ref 0–0.2)
BASOPHILS NFR BLD AUTO: 1 %
BUN SERPL-SCNC: 9 MG/DL (ref 7–17)
CALCIUM SPEC-MCNC: 9.5 MG/DL (ref 8.4–10.2)
CHLORIDE SERPL-SCNC: 106 MMOL/L (ref 98–107)
CO2 SERPL-SCNC: 23 MMOL/L (ref 22–30)
EOSINOPHIL # BLD AUTO: 0.3 K/UL (ref 0–0.7)
EOSINOPHIL NFR BLD AUTO: 3 %
ERYTHROCYTE [DISTWIDTH] IN BLOOD BY AUTOMATED COUNT: 4.06 M/UL (ref 3.8–5.4)
ERYTHROCYTE [DISTWIDTH] IN BLOOD: 12.9 % (ref 11.5–15.5)
GLUCOSE SERPL-MCNC: 86 MG/DL (ref 74–99)
HCT VFR BLD AUTO: 37.9 % (ref 34–46)
HGB BLD-MCNC: 13 GM/DL (ref 11.4–16)
LIPASE SERPL-CCNC: 66 U/L (ref 23–300)
LYMPHOCYTES # SPEC AUTO: 1.8 K/UL (ref 1–4.8)
LYMPHOCYTES NFR SPEC AUTO: 15 %
MCH RBC QN AUTO: 32.1 PG (ref 25–35)
MCHC RBC AUTO-ENTMCNC: 34.5 G/DL (ref 31–37)
MCV RBC AUTO: 93.3 FL (ref 80–100)
MONOCYTES # BLD AUTO: 0.5 K/UL (ref 0–1)
MONOCYTES NFR BLD AUTO: 4 %
NEUTROPHILS # BLD AUTO: 9 K/UL (ref 1.3–7.7)
NEUTROPHILS NFR BLD AUTO: 76 %
PLATELET # BLD AUTO: 264 K/UL (ref 150–450)
POTASSIUM SERPL-SCNC: 3.8 MMOL/L (ref 3.5–5.1)
PROT SERPL-MCNC: 6.2 G/DL (ref 6.3–8.2)
RBC UR QL: >182 /HPF (ref 0–5)
SODIUM SERPL-SCNC: 137 MMOL/L (ref 137–145)
WBC # BLD AUTO: 11.8 K/UL (ref 3.8–10.6)
WBC # UR AUTO: >182 /HPF (ref 0–5)

## 2021-08-22 PROCEDURE — 36415 COLL VENOUS BLD VENIPUNCTURE: CPT

## 2021-08-22 PROCEDURE — 81001 URINALYSIS AUTO W/SCOPE: CPT

## 2021-08-22 PROCEDURE — 83690 ASSAY OF LIPASE: CPT

## 2021-08-22 PROCEDURE — 85025 COMPLETE CBC W/AUTO DIFF WBC: CPT

## 2021-08-22 PROCEDURE — 80053 COMPREHEN METABOLIC PANEL: CPT

## 2021-08-22 PROCEDURE — 87086 URINE CULTURE/COLONY COUNT: CPT

## 2021-08-22 PROCEDURE — 99284 EMERGENCY DEPT VISIT MOD MDM: CPT

## 2021-08-22 PROCEDURE — 74177 CT ABD & PELVIS W/CONTRAST: CPT

## 2021-08-22 PROCEDURE — 96361 HYDRATE IV INFUSION ADD-ON: CPT

## 2021-08-22 PROCEDURE — 87591 N.GONORRHOEAE DNA AMP PROB: CPT

## 2021-08-22 PROCEDURE — 87491 CHLMYD TRACH DNA AMP PROBE: CPT

## 2021-08-22 PROCEDURE — 96374 THER/PROPH/DIAG INJ IV PUSH: CPT

## 2021-08-22 PROCEDURE — 81025 URINE PREGNANCY TEST: CPT

## 2021-08-22 PROCEDURE — 82150 ASSAY OF AMYLASE: CPT

## 2021-08-22 NOTE — CT
EXAMINATION TYPE: CT abdomen pelvis w con

 

DATE OF EXAM: 8/22/2021

 

COMPARISON: None

 

HISTORY: Abdominal pain

 

CT DLP: 769.7 mGycm

Automated exposure control for dose reduction was used.

 

TECHNIQUE:  Helical acquisition of images was performed from the lung bases through the pelvis.

 

CONTRAST: 

Performed without Oral Contrast and with IV Contrast, patient injected with 100 ml mL of Isovue 300.

 

FINDINGS: 

 

The lung bases are clear.

 

The gallbladder is normal and there is no pericholecystic fluid, gallbladder distention, gallbladder 
wall thickening or gallstones. There is no biliary ductal dilatation.

 

There is no focal mass or organomegaly involving the liver pancreas, spleen or adrenal glands.

 

The kidneys excrete contrast probably symmetrically and there is no solid renal mass or hydronephrosi
s. There is no retroperitoneal adenopathy or hemorrhage in the caliber of the abdominal aorta is norm
al.

 

The bowel loops are normal in caliber. There is no bowel wall thickening. There are no inflammatory c
hanges in the mesentery. There is no free intraperitoneal air or fluid.

 

There is a 7 cm fluid density mass in the midline pelvis extending to the left of midline most likely
 representing a large ovarian cyst.

 

There is no pelvic adenopathy or free fluid.

 

The visualized osseous structures are intact.

 

IMPRESSION:

 

7 cm cyst within the pelvis as described above consistent with a ovarian cyst. Short-term follow-up w
ith pelvic ultrasound is recommended.
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
## 2025-04-30 ENCOUNTER — HOSPITAL ENCOUNTER (OUTPATIENT)
Dept: HOSPITAL 47 - RADXRMAIN | Age: 25
Discharge: HOME | End: 2025-04-30
Attending: EMERGENCY MEDICINE
Payer: COMMERCIAL

## 2025-04-30 DIAGNOSIS — S23.3XXA: Primary | ICD-10-CM
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
## 2025-04-30 PROCEDURE — 72072 X-RAY EXAM THORAC SPINE 3VWS: CPT
